# Patient Record
Sex: MALE | Race: WHITE | NOT HISPANIC OR LATINO | Employment: UNEMPLOYED | URBAN - METROPOLITAN AREA
[De-identification: names, ages, dates, MRNs, and addresses within clinical notes are randomized per-mention and may not be internally consistent; named-entity substitution may affect disease eponyms.]

---

## 2017-08-14 ENCOUNTER — HOSPITAL ENCOUNTER (EMERGENCY)
Facility: HOSPITAL | Age: 47
Discharge: HOME | End: 2017-08-14
Attending: EMERGENCY MEDICINE

## 2017-08-14 ENCOUNTER — APPOINTMENT (EMERGENCY)
Dept: CT IMAGING | Facility: HOSPITAL | Age: 47
End: 2017-08-14

## 2017-08-14 VITALS
HEIGHT: 71 IN | BODY MASS INDEX: 23.1 KG/M2 | RESPIRATION RATE: 18 BRPM | OXYGEN SATURATION: 97 % | DIASTOLIC BLOOD PRESSURE: 97 MMHG | WEIGHT: 165 LBS | SYSTOLIC BLOOD PRESSURE: 141 MMHG | TEMPERATURE: 97.5 F | HEART RATE: 85 BPM
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
## 2017-08-14 DIAGNOSIS — G44.309 POST-TRAUMATIC HEADACHE: ICD-10-CM

## 2017-08-14 DIAGNOSIS — S01.312A LACERATION OF HELIX OF LEFT EAR, INITIAL ENCOUNTER: ICD-10-CM

## 2017-08-14 DIAGNOSIS — F10.929 ALCOHOL INTOXICATION (HCC): ICD-10-CM

## 2017-08-14 DIAGNOSIS — M47.812 CERVICAL SPINE DEGENERATION: ICD-10-CM

## 2017-08-14 DIAGNOSIS — S09.8XXA BLUNT HEAD TRAUMA, INITIAL ENCOUNTER: Primary | ICD-10-CM

## 2017-08-14 PROCEDURE — 90715 TDAP VACCINE 7 YRS/> IM: CPT | Performed by: EMERGENCY MEDICINE

## 2017-08-14 PROCEDURE — 70450 CT HEAD/BRAIN W/O DYE: CPT

## 2017-08-14 PROCEDURE — 90471 IMMUNIZATION ADMIN: CPT

## 2017-08-14 PROCEDURE — 99284 EMERGENCY DEPT VISIT MOD MDM: CPT

## 2017-08-14 PROCEDURE — 72125 CT NECK SPINE W/O DYE: CPT

## 2017-08-14 RX ORDER — LIDOCAINE HYDROCHLORIDE 10 MG/ML
10 INJECTION, SOLUTION EPIDURAL; INFILTRATION; INTRACAUDAL; PERINEURAL ONCE
Status: COMPLETED | OUTPATIENT
Start: 2017-08-14 | End: 2017-08-14

## 2017-08-14 RX ADMIN — LIDOCAINE HYDROCHLORIDE 10 ML: 10 INJECTION, SOLUTION EPIDURAL; INFILTRATION; INTRACAUDAL; PERINEURAL at 03:23

## 2017-08-14 RX ADMIN — TETANUS TOXOID, REDUCED DIPHTHERIA TOXOID AND ACELLULAR PERTUSSIS VACCINE, ADSORBED 0.5 ML: 5; 2.5; 8; 8; 2.5 SUSPENSION INTRAMUSCULAR at 03:43

## 2018-09-20 ENCOUNTER — HOSPITAL ENCOUNTER (EMERGENCY)
Facility: HOSPITAL | Age: 48
Discharge: HOME | End: 2018-09-20
Attending: EMERGENCY MEDICINE | Admitting: EMERGENCY MEDICINE
Payer: COMMERCIAL

## 2018-09-20 VITALS
BODY MASS INDEX: 24.97 KG/M2 | SYSTOLIC BLOOD PRESSURE: 160 MMHG | DIASTOLIC BLOOD PRESSURE: 80 MMHG | WEIGHT: 179.01 LBS | HEART RATE: 70 BPM | OXYGEN SATURATION: 98 % | RESPIRATION RATE: 20 BRPM | TEMPERATURE: 97.9 F

## 2018-09-20 DIAGNOSIS — F10.10 ALCOHOL ABUSE: Primary | ICD-10-CM

## 2018-09-20 LAB — ETHANOL EXG-MCNC: 0 MG/DL

## 2018-09-20 PROCEDURE — 82075 ASSAY OF BREATH ETHANOL: CPT | Performed by: EMERGENCY MEDICINE

## 2018-09-20 PROCEDURE — 99284 EMERGENCY DEPT VISIT MOD MDM: CPT

## 2018-09-20 RX ORDER — DIAZEPAM 5 MG/1
5 TABLET ORAL EVERY 6 HOURS PRN
Status: DISCONTINUED | OUTPATIENT
Start: 2018-09-20 | End: 2018-09-20 | Stop reason: HOSPADM

## 2018-09-20 RX ADMIN — DIAZEPAM 5 MG: 5 TABLET ORAL at 14:36

## 2018-09-20 NOTE — DISCHARGE INSTRUCTIONS
Abuse of Alcohol   WHAT YOU SHOULD KNOW:   Alcohol abuse is when you drink large amounts of alcohol often to change your mood or behavior  CARE AGREEMENT:   You have the right to help plan your care  Learn about your health condition and how it may be treated  Discuss treatment options with your caregivers to decide what care you want to receive  You always have the right to refuse treatment  RISKS:   Medicines to treat alcohol abuse may cause vomiting, stress, anxiety, headaches, or dizziness  Alcohol abuse puts you at risk for disease and injury  Alcohol can damage your brain, heart, kidneys, lungs, and liver  The risk of stroke is greater if you have 5 or more drinks each day  You may act out violently when you abuse alcohol  You may harm yourself and others  Risky sexual behavior could lead to sexually transmitted infections  If you are pregnant and drink alcohol, you and your baby are at risk for serious health problems  Alcohol abuse may put you in a coma and may be life-threatening  WHILE YOU ARE HERE:   Informed consent  is a legal document that explains the tests, treatments, or procedures that you may need  Informed consent means you understand what will be done and can make decisions about what you want  You give your permission when you sign the consent form  You can have someone sign this form for you if you are not able to sign it  You have the right to understand your medical care in words you know  Before you sign the consent form, understand the risks and benefits of what will be done  Make sure all your questions are answered  Psychiatric assessment:  Caregivers will ask if you have a history of psychological trauma, such as physical, sexual, or mental abuse  They will ask if you were given the care that you needed  Caregivers will ask you if you have been a victim of a crime or natural disaster, or if you have a serious injury or disease   They will ask you if you have seen other people being harmed, such as in combat  You will be asked if you drink alcohol or use drugs at present or in the past  Caregivers will ask you if you want to hurt or kill yourself or others  How you answer these questions can help caregivers decide on treatment  To help during treatment, caregivers will ask you about such things as how you feel about it and your hobbies and goals  Caregivers will also ask you about the people in your life who support you  Pulse oximeter: A pulse oximeter is a device that measures the amount of oxygen in your blood  A cord with a clip or sticky strip is placed on your finger, ear, or toe  The other end of the cord is hooked to a machine  Never turn the pulse oximeter or alarm off  An alarm will sound if your oxygen level is low or cannot be read  Vital signs:  Caregivers will check your blood pressure, heart rate, breathing rate, and temperature  They will also ask about your pain  These vital signs give caregivers information about your current health  Intake and Output:  Healthcare providers will keep track of the amount of liquid you are getting  They may also need to know how much you are urinating  Ask your healthcare provider how much liquid you should have each day  You may need to increase or decrease the amount of liquid you have each day  Ask healthcare providers if they need to measure or collect your urine before you dispose of it  An IV  is a small tube placed in your vein that is used to give you medicine or liquids  Medicines:   · Sedative: This medicine is given to help you stay calm and relaxed  · Anticonvulsant medicine: This medicine is given to control seizures  Take this medicine exactly as directed  · Antinausea medicine: This medicine may be given to calm your stomach and prevent vomiting  · Glucose: This medicine may be given to increase the amount of sugar in your blood       · Vitamin supplement:  Alcohol can make it hard for your body to absorb enough vitamin B1  You may be given vitamin B1 if you have low levels  It is also given to prevent alcohol related brain damage  You may also need other vitamin supplements  Tests:   · Blood and urine tests:  Samples of your blood or urine are tested for alcohol  Tests can also show signs of liver, kidney, or heart damage caused by alcohol  You may need to have these tests more than once  · Neurologic exam:  This is also called neuro signs, neuro checks, or neuro status  A neurologic exam can show caregivers how well your brain works after an injury or illness  Caregivers will check how your pupils (black dots in the center of each eye) react to light  They may check your memory and how easily you wake up  Your hand grasp and balance may also be tested  · EKG: This test records the electrical activity of your heart  It will be used to check for damage or problems caused by alcohol  · Chest x-ray: This is a picture of your lungs and heart  Healthcare providers may use the x-ray to look for heart damage, injuries, or signs of infection, such as pneumonia  · CT scan: This test is also called a CAT scan  An x-ray machine uses a computer to take pictures of your brain  The pictures may show damage caused by alcohol abuse  You may be given a dye before the pictures are taken to help healthcare providers see the pictures better  Tell the healthcare provider if you have ever had an allergic reaction to contrast dye  Treatment:   · Brief intervention therapy:  A healthcare provider meets with you to discuss ways to control your risky behaviors, such as drinking and driving  This therapy also helps you set goals to decrease the amount of alcohol you drink  · Breathing support:      ¨ You may need extra oxygen  if your blood oxygen level is lower than it should be  You may get oxygen through a mask placed over your nose and mouth or through small tubes placed in your nostrils   Ask your healthcare provider before you take off the mask or oxygen tubing  ¨ A ventilator  is a machine that gives you oxygen and breathes for you when you cannot breathe well on your own  An endotracheal (ET) tube is put into your mouth or nose and attached to the ventilator  You may need a trach if an ET tube cannot be placed  A trach is a tube put through an incision and into your windpipe  © 2014 [employer] is for End User's use only and may not be sold, redistributed or otherwise used for commercial purposes  All illustrations and images included in CareNotes® are the copyrighted property of A [employer] A M [employer]  or Pavel Ryder  The above information is an  only  It is not intended as medical advice for individual conditions or treatments  Talk to your doctor, nurse or pharmacist before following any medical regimen to see if it is safe and effective for you

## 2018-09-20 NOTE — ED PROVIDER NOTES
History  Chief Complaint   Patient presents with    Detox Evaluation     pt requesting inpt detox services for alcohol abuse     50yo male states he has been an alcoholic daily drinking for the past year  Before was a weekend drinker and alcohol use increased over the past 3 years, but over the past year since his wife kicked him out  He denies any psych history  His PCP prescribes seroquel for sleep at night  He denies SI/HI/AH/VH  He drinks about 100oz of beer a day, has never had full blown alcohol withdrawal but does get shakey if he does not drink and needs a drink to wake him up in the morning to calm the shakes  He last drank a 24oz beer this AM         History provided by:  Patient  Detox Evaluation   Similar prior episodes: yes    Severity:  Moderate  Onset quality:  Gradual  Duration:  1 day  Timing:  Constant  Progression:  Worsening  Chronicity:  New  Suspected agents:  Alcohol  Associated symptoms: no abdominal pain, no agitation, no bladder incontinence, no bowel incontinence, no confusion, no hallucinations, no headaches, no loss of consciousness, no palpitations, no seizures, no shortness of breath, no somnolence, no suicidal ideation, no violence, no vomiting and no weakness    Associated symptoms comment:  Shaking  Risk factors: addiction treatment (has done detox in December and January)    Risk factors: no chronic illness, no mental illness, no psychiatric hx and no withdrawal syndrome        None       Past Medical History:   Diagnosis Date    Alcohol abuse     Hepatitis B        History reviewed  No pertinent surgical history  History reviewed  No pertinent family history  I have reviewed and agree with the history as documented      Social History   Substance Use Topics    Smoking status: Current Every Day Smoker     Packs/day: 1 00    Smokeless tobacco: Never Used    Alcohol use Yes      Comment: over 100 ounces of steel reserve beer/day        Review of Systems   Respiratory: Negative for shortness of breath  Cardiovascular: Negative for palpitations  Gastrointestinal: Negative for abdominal pain, bowel incontinence and vomiting  Genitourinary: Negative for bladder incontinence  Neurological: Negative for seizures, loss of consciousness, weakness and headaches  Psychiatric/Behavioral: Negative for agitation, confusion, hallucinations and suicidal ideas  All other systems reviewed and are negative  Physical Exam  Physical Exam   Constitutional: He is oriented to person, place, and time  He appears well-developed and well-nourished  HENT:   Head: Normocephalic and atraumatic  Eyes: EOM are normal  Pupils are equal, round, and reactive to light  Neck: Neck supple  Cardiovascular: Normal rate and regular rhythm  Pulmonary/Chest: Effort normal and breath sounds normal    Abdominal: Soft  Bowel sounds are normal  He exhibits no distension  There is no tenderness  Musculoskeletal: He exhibits no edema  Neurological: He is alert and oriented to person, place, and time  He exhibits normal muscle tone  Skin: Skin is warm  Psychiatric: His mood appears anxious  Vitals reviewed        Vital Signs  ED Triage Vitals [09/20/18 1250]   Temperature Pulse Respirations Blood Pressure SpO2   97 9 °F (36 6 °C) 85 18 (!) 175/92 97 %      Temp Source Heart Rate Source Patient Position - Orthostatic VS BP Location FiO2 (%)   Oral -- -- -- --      Pain Score       --           Vitals:    09/20/18 1250   BP: (!) 175/92   Pulse: 85       Visual Acuity      ED Medications  Medications   diazepam (VALIUM) tablet 5 mg (5 mg Oral Given 9/20/18 1436)       Diagnostic Studies  Results Reviewed     Procedure Component Value Units Date/Time    POCT alcohol breath test [[id number]]  (Normal) Resulted:  09/20/18 1352    Lab Status:  Final result Updated:  09/20/18 1352     EXTBreath Alcohol 0 000                 No orders to display              Procedures  Procedures       Phone Contacts  ED Phone Contact    ED Course  ED Course as of Sep 20 1619   Thu Sep 20, 2018   1614 Note from crisis says pt has placement available detox  They will pick him up at home at 7:30  MDM  Number of Diagnoses or Management Options  Alcohol abuse: new and requires workup     Amount and/or Complexity of Data Reviewed  Clinical lab tests: ordered and reviewed  Discuss the patient with other providers: yes      CritCare Time    Disposition  Final diagnoses:   Alcohol abuse     Time reflects when diagnosis was documented in both MDM as applicable and the Disposition within this note     Time User Action Codes Description Comment    9/20/2018  2:10 PM Milton STEVENSON Add [F10 10] Alcohol abuse       ED Disposition     ED Disposition Condition Comment    Discharge  Karlos Haas discharge to home/self care  Condition at discharge: Good        Follow-up Information     Follow up With Specialties Details Why [address] [place] [address],  Family Medicine   [address] [place] [address] [address]  [phone]      Go to detox at 7:30 that was arranged  Patient's Medications    No medications on file     No discharge procedures on file      ED Provider  Electronically Signed by           Arielle Amezquita MD  09/20/18 9092

## 2018-09-20 NOTE — ED NOTES
Pt is a 46 y o  male who was brought to the ED seeking detox for alcohol abuse  Pt reports that he has been drinking about 2-3 40oz beers a day  Pt came to the ER as he felt as if he was actively detoxing since he had not drank since around 9am  Pt relates that he went to probation yesterday, but had been drinking  He was supposed to be back today, but due to detoxing he came here instead  Pt reports that he informed his  that he was coming here  Pt has been to detox twice in the past, last being in January 2018  Pt never followed up with outpatient treatment  Pt is now on probation in [place] for DUI charges  Pt denies any know seizures or DTs, pt's withdrawal symptoms usually consist of shaking, cold sweats, stomach pains and diarrhea  Pt denies any suicidal/homicidal ideations and auditory/visual hallucinations  Pt has no MH history  Pt does feel he needs inpatient detox at this time to help get clean       Chief Complaint   Patient presents with    Detox Evaluation     pt requesting inpt detox services for alcohol abuse     Intake Assessment completed, Safety risk Assessment completed    CLARA Gamino  09/20/18   [id number]

## 2018-09-20 NOTE — ED NOTES
Spoke with Ysabel at [facility], who confirmed pt is scheduled to be picked up by their team at 19:30  Pt will be picked up from his home so that he can get things together prior to going       CLARA Hill  09/20/18   [id number]

## 2018-09-20 NOTE — ED NOTES
Call placed to [facility], spoke with Ysabel, who reports a detox bed is available at their [facility] location  Pt currently on phone providing additional information       BaljeetCLARA Pacheco  09/20/18   1600

## 2018-10-20 ENCOUNTER — HOSPITAL ENCOUNTER (EMERGENCY)
Facility: HOSPITAL | Age: 48
Discharge: HOME | End: 2018-10-21
Attending: EMERGENCY MEDICINE | Admitting: EMERGENCY MEDICINE
Payer: COMMERCIAL

## 2018-10-20 DIAGNOSIS — F10.10 ALCOHOL ABUSE: Primary | ICD-10-CM

## 2018-10-20 DIAGNOSIS — F10.239 ALCOHOL WITHDRAWAL (HCC): ICD-10-CM

## 2018-10-20 LAB
ANION GAP SERPL CALCULATED.3IONS-SCNC: 14 MMOL/L (ref 4–13)
APAP SERPL-MCNC: <2 UG/ML (ref 10–30)
BASOPHILS # BLD AUTO: 0.1 THOUSANDS/ΜL (ref 0–0.1)
BASOPHILS NFR BLD AUTO: 1 % (ref 0–1)
BUN SERPL-MCNC: 12 MG/DL (ref 5–25)
CALCIUM SERPL-MCNC: 8.8 MG/DL (ref 8.3–10.1)
CHLORIDE SERPL-SCNC: 102 MMOL/L (ref 100–108)
CO2 SERPL-SCNC: 25 MMOL/L (ref 21–32)
CREAT SERPL-MCNC: 0.88 MG/DL (ref 0.6–1.3)
EOSINOPHIL # BLD AUTO: 0.18 THOUSAND/ΜL (ref 0–0.61)
EOSINOPHIL NFR BLD AUTO: 2 % (ref 0–6)
ERYTHROCYTE [DISTWIDTH] IN BLOOD BY AUTOMATED COUNT: 13.1 % (ref 11.6–15.1)
ETHANOL EXG-MCNC: 0.28 MG/DL
ETHANOL SERPL-MCNC: 361 MG/DL (ref 0–3)
GFR SERPL CREATININE-BSD FRML MDRD: 100 ML/MIN/1.73SQ M
GLUCOSE SERPL-MCNC: 154 MG/DL (ref 65–140)
HCT VFR BLD AUTO: 51.1 % (ref 36.5–49.3)
HGB BLD-MCNC: 17.8 G/DL (ref 12–17)
IMM GRANULOCYTES # BLD AUTO: 0.03 THOUSAND/UL (ref 0–0.2)
IMM GRANULOCYTES NFR BLD AUTO: 0 % (ref 0–2)
LYMPHOCYTES # BLD AUTO: 2.53 THOUSANDS/ΜL (ref 0.6–4.47)
LYMPHOCYTES NFR BLD AUTO: 26 % (ref 14–44)
MCH RBC QN AUTO: 33.6 PG (ref 26.8–34.3)
MCHC RBC AUTO-ENTMCNC: 34.8 G/DL (ref 31.4–37.4)
MCV RBC AUTO: 97 FL (ref 82–98)
MONOCYTES # BLD AUTO: 0.9 THOUSAND/ΜL (ref 0.17–1.22)
MONOCYTES NFR BLD AUTO: 9 % (ref 4–12)
NEUTROPHILS # BLD AUTO: 6.14 THOUSANDS/ΜL (ref 1.85–7.62)
NEUTS SEG NFR BLD AUTO: 62 % (ref 43–75)
NRBC BLD AUTO-RTO: 0 /100 WBCS
PLATELET # BLD AUTO: 224 THOUSANDS/UL (ref 149–390)
PMV BLD AUTO: 9.1 FL (ref 8.9–12.7)
POTASSIUM SERPL-SCNC: 3.7 MMOL/L (ref 3.5–5.3)
RBC # BLD AUTO: 5.29 MILLION/UL (ref 3.88–5.62)
SALICYLATES SERPL-MCNC: 6 MG/DL (ref 3–20)
SODIUM SERPL-SCNC: 141 MMOL/L (ref 136–145)
WBC # BLD AUTO: 9.88 THOUSAND/UL (ref 4.31–10.16)

## 2018-10-20 PROCEDURE — 93005 ELECTROCARDIOGRAM TRACING: CPT

## 2018-10-20 PROCEDURE — 96360 HYDRATION IV INFUSION INIT: CPT

## 2018-10-20 PROCEDURE — 80048 BASIC METABOLIC PNL TOTAL CA: CPT | Performed by: EMERGENCY MEDICINE

## 2018-10-20 PROCEDURE — 36415 COLL VENOUS BLD VENIPUNCTURE: CPT | Performed by: EMERGENCY MEDICINE

## 2018-10-20 PROCEDURE — 99285 EMERGENCY DEPT VISIT HI MDM: CPT

## 2018-10-20 PROCEDURE — 85025 COMPLETE CBC W/AUTO DIFF WBC: CPT | Performed by: EMERGENCY MEDICINE

## 2018-10-20 PROCEDURE — 80329 ANALGESICS NON-OPIOID 1 OR 2: CPT | Performed by: EMERGENCY MEDICINE

## 2018-10-20 PROCEDURE — 96361 HYDRATE IV INFUSION ADD-ON: CPT

## 2018-10-20 PROCEDURE — 80320 DRUG SCREEN QUANTALCOHOLS: CPT | Performed by: EMERGENCY MEDICINE

## 2018-10-20 PROCEDURE — 80076 HEPATIC FUNCTION PANEL: CPT | Performed by: EMERGENCY MEDICINE

## 2018-10-20 PROCEDURE — 82075 ASSAY OF BREATH ETHANOL: CPT | Performed by: EMERGENCY MEDICINE

## 2018-10-20 RX ORDER — BUPROPION HYDROCHLORIDE 150 MG/1
TABLET ORAL
COMMUNITY
Start: 2018-10-05

## 2018-10-20 RX ORDER — SODIUM CHLORIDE 9 MG/ML
1000 INJECTION, SOLUTION INTRAVENOUS ONCE
Status: COMPLETED | OUTPATIENT
Start: 2018-10-20 | End: 2018-10-21

## 2018-10-20 RX ORDER — OXAZEPAM 10 MG
CAPSULE ORAL
COMMUNITY
Start: 2017-12-12

## 2018-10-20 RX ORDER — VARENICLINE TARTRATE 1 MG/1
TABLET, FILM COATED ORAL
COMMUNITY
Start: 2017-01-17 | End: 2018-10-21

## 2018-10-20 RX ORDER — BUSPIRONE HYDROCHLORIDE 10 MG/1
TABLET ORAL
COMMUNITY
Start: 2017-12-12 | End: 2018-10-21

## 2018-10-20 RX ADMIN — SODIUM CHLORIDE 1000 ML/HR: 0.9 INJECTION, SOLUTION INTRAVENOUS at 21:34

## 2018-10-21 VITALS
TEMPERATURE: 98.2 F | RESPIRATION RATE: 20 BRPM | DIASTOLIC BLOOD PRESSURE: 85 MMHG | HEIGHT: 71 IN | OXYGEN SATURATION: 95 % | SYSTOLIC BLOOD PRESSURE: 159 MMHG | BODY MASS INDEX: 23.95 KG/M2 | HEART RATE: 107 BPM | WEIGHT: 171.08 LBS

## 2018-10-21 LAB
ALBUMIN SERPL BCP-MCNC: 4.1 G/DL (ref 3.5–5)
ALP SERPL-CCNC: 96 U/L (ref 46–116)
ALT SERPL W P-5'-P-CCNC: 114 U/L (ref 12–78)
AST SERPL W P-5'-P-CCNC: 144 U/L (ref 5–45)
BILIRUB DIRECT SERPL-MCNC: 0.19 MG/DL (ref 0–0.2)
BILIRUB SERPL-MCNC: 0.5 MG/DL (ref 0.2–1)
ETHANOL EXG-MCNC: 0.05 MG/DL
ETHANOL EXG-MCNC: NORMAL MG/DL
ETHANOL SERPL-MCNC: 113 MG/DL (ref 0–3)
PROT SERPL-MCNC: 8 G/DL (ref 6.4–8.2)

## 2018-10-21 PROCEDURE — 80320 DRUG SCREEN QUANTALCOHOLS: CPT | Performed by: EMERGENCY MEDICINE

## 2018-10-21 PROCEDURE — 82075 ASSAY OF BREATH ETHANOL: CPT | Performed by: EMERGENCY MEDICINE

## 2018-10-21 PROCEDURE — 36415 COLL VENOUS BLD VENIPUNCTURE: CPT | Performed by: EMERGENCY MEDICINE

## 2018-10-21 PROCEDURE — 96372 THER/PROPH/DIAG INJ SC/IM: CPT

## 2018-10-21 PROCEDURE — 96361 HYDRATE IV INFUSION ADD-ON: CPT

## 2018-10-21 RX ORDER — NICOTINE 21 MG/24HR
21 PATCH, TRANSDERMAL 24 HOURS TRANSDERMAL DAILY
Status: DISCONTINUED | OUTPATIENT
Start: 2018-10-21 | End: 2018-10-21

## 2018-10-21 RX ORDER — LORAZEPAM 1 MG/1
1 TABLET ORAL ONCE
Status: COMPLETED | OUTPATIENT
Start: 2018-10-21 | End: 2018-10-21

## 2018-10-21 RX ORDER — NICOTINE 21 MG/24HR
21 PATCH, TRANSDERMAL 24 HOURS TRANSDERMAL DAILY
Status: DISCONTINUED | OUTPATIENT
Start: 2018-10-21 | End: 2018-10-21 | Stop reason: HOSPADM

## 2018-10-21 RX ORDER — LORAZEPAM 2 MG/ML
1 INJECTION INTRAMUSCULAR ONCE
Status: COMPLETED | OUTPATIENT
Start: 2018-10-21 | End: 2018-10-21

## 2018-10-21 RX ADMIN — LORAZEPAM 1 MG: 1 TABLET ORAL at 01:17

## 2018-10-21 RX ADMIN — LORAZEPAM 1 MG: 2 INJECTION INTRAMUSCULAR; INTRAVENOUS at 11:20

## 2018-10-21 RX ADMIN — NICOTINE 21 MG: 21 PATCH TRANSDERMAL at 01:20

## 2018-10-21 RX ADMIN — LORAZEPAM 1 MG: 1 TABLET ORAL at 09:01

## 2018-10-21 NOTE — ED NOTES
Pt has become increasingly agitated toward staff and insisting on leaving to go see his girlfriend, attempts were made to calm patient down which were unsuccessful, provider made aware and orders were placed     Makenzie Ortega RN  10/21/18 2790

## 2018-10-21 NOTE — ED NOTES
Call placed to [facility], spoke with Cecil, who states there is a detox bed available today and their [address] locations  Pt was placed on phone to complete initial assessment       CLARA Jones  10/21/18   4758

## 2018-10-21 NOTE — DISCHARGE INSTRUCTIONS
Abuse of Alcohol   WHAT YOU SHOULD KNOW:   Alcohol abuse is when you drink large amounts of alcohol often to change your mood or behavior  CARE AGREEMENT:   You have the right to help plan your care  Learn about your health condition and how it may be treated  Discuss treatment options with your caregivers to decide what care you want to receive  You always have the right to refuse treatment  RISKS:   Medicines to treat alcohol abuse may cause vomiting, stress, anxiety, headaches, or dizziness  Alcohol abuse puts you at risk for disease and injury  Alcohol can damage your brain, heart, kidneys, lungs, and liver  The risk of stroke is greater if you have 5 or more drinks each day  You may act out violently when you abuse alcohol  You may harm yourself and others  Risky sexual behavior could lead to sexually transmitted infections  If you are pregnant and drink alcohol, you and your baby are at risk for serious health problems  Alcohol abuse may put you in a coma and may be life-threatening  WHILE YOU ARE HERE:   Informed consent  is a legal document that explains the tests, treatments, or procedures that you may need  Informed consent means you understand what will be done and can make decisions about what you want  You give your permission when you sign the consent form  You can have someone sign this form for you if you are not able to sign it  You have the right to understand your medical care in words you know  Before you sign the consent form, understand the risks and benefits of what will be done  Make sure all your questions are answered  Psychiatric assessment:  Caregivers will ask if you have a history of psychological trauma, such as physical, sexual, or mental abuse  They will ask if you were given the care that you needed  Caregivers will ask you if you have been a victim of a crime or natural disaster, or if you have a serious injury or disease   They will ask you if you have seen other people being harmed, such as in combat  You will be asked if you drink alcohol or use drugs at present or in the past  Caregivers will ask you if you want to hurt or kill yourself or others  How you answer these questions can help caregivers decide on treatment  To help during treatment, caregivers will ask you about such things as how you feel about it and your hobbies and goals  Caregivers will also ask you about the people in your life who support you  Pulse oximeter: A pulse oximeter is a device that measures the amount of oxygen in your blood  A cord with a clip or sticky strip is placed on your finger, ear, or toe  The other end of the cord is hooked to a machine  Never turn the pulse oximeter or alarm off  An alarm will sound if your oxygen level is low or cannot be read  Vital signs:  Caregivers will check your blood pressure, heart rate, breathing rate, and temperature  They will also ask about your pain  These vital signs give caregivers information about your current health  Intake and Output:  Healthcare providers will keep track of the amount of liquid you are getting  They may also need to know how much you are urinating  Ask your healthcare provider how much liquid you should have each day  You may need to increase or decrease the amount of liquid you have each day  Ask healthcare providers if they need to measure or collect your urine before you dispose of it  An IV  is a small tube placed in your vein that is used to give you medicine or liquids  Medicines:   · Sedative: This medicine is given to help you stay calm and relaxed  · Anticonvulsant medicine: This medicine is given to control seizures  Take this medicine exactly as directed  · Antinausea medicine: This medicine may be given to calm your stomach and prevent vomiting  · Glucose: This medicine may be given to increase the amount of sugar in your blood       · Vitamin supplement:  Alcohol can make it hard for your body to absorb enough vitamin B1  You may be given vitamin B1 if you have low levels  It is also given to prevent alcohol related brain damage  You may also need other vitamin supplements  Tests:   · Blood and urine tests:  Samples of your blood or urine are tested for alcohol  Tests can also show signs of liver, kidney, or heart damage caused by alcohol  You may need to have these tests more than once  · Neurologic exam:  This is also called neuro signs, neuro checks, or neuro status  A neurologic exam can show caregivers how well your brain works after an injury or illness  Caregivers will check how your pupils (black dots in the center of each eye) react to light  They may check your memory and how easily you wake up  Your hand grasp and balance may also be tested  · EKG: This test records the electrical activity of your heart  It will be used to check for damage or problems caused by alcohol  · Chest x-ray: This is a picture of your lungs and heart  Healthcare providers may use the x-ray to look for heart damage, injuries, or signs of infection, such as pneumonia  · CT scan: This test is also called a CAT scan  An x-ray machine uses a computer to take pictures of your brain  The pictures may show damage caused by alcohol abuse  You may be given a dye before the pictures are taken to help healthcare providers see the pictures better  Tell the healthcare provider if you have ever had an allergic reaction to contrast dye  Treatment:   · Brief intervention therapy:  A healthcare provider meets with you to discuss ways to control your risky behaviors, such as drinking and driving  This therapy also helps you set goals to decrease the amount of alcohol you drink  · Breathing support:      ¨ You may need extra oxygen  if your blood oxygen level is lower than it should be  You may get oxygen through a mask placed over your nose and mouth or through small tubes placed in your nostrils   Ask your healthcare provider before you take off the mask or oxygen tubing  ¨ A ventilator  is a machine that gives you oxygen and breathes for you when you cannot breathe well on your own  An endotracheal (ET) tube is put into your mouth or nose and attached to the ventilator  You may need a trach if an ET tube cannot be placed  A trach is a tube put through an incision and into your windpipe  © 2014 [address] [employer] is for End User's use only and may not be sold, redistributed or otherwise used for commercial purposes  All illustrations and images included in CareNotes® are the copyrighted property of A D A M , Inc  or Pavel [employer]  The above information is an  only  It is not intended as medical advice for individual conditions or treatments  Talk to your doctor, nurse or pharmacist before following any medical regimen to see if it is safe and effective for you  Abuse of Alcohol   AMBULATORY CARE:   Alcohol abuse   · is unhealthy drinking behavior  You may drink too much at one time once a week, or continue to drink too much daily  You continue to drink even though it causes problems  The problems can be alcohol related legal problems or problems with work or family  · If you drink too much at one time, you are binge drinking  Binge drinking is when you have a large amount of alcohol in a short time  Your blood alcohol concentrations (JONATHON) goes above 0 08 g/dLlevel during binge drinking  For men, this usually happens with more than 4 drinks in 2 hours  For women, it is more than 3 drinks in 2 hours  A drink is 12 ounces of beer, 4 ounces of wine, or 1½ ounces of liquor  Common signs and symptoms of alcohol abuse include:  Each person that abuses alcohol may have different symptoms   The following are common signs and symptoms of alcohol abuse:  · Loss of interest in activities, work, and school    · Decreased interest in family and friends    · Depression    · Constant thoughts about drinking    · Not able to control the amount you drink    · Restlessness, or erratic and violent behavior  Call 911 for any of the following:   · You have sudden chest pain or trouble breathing  · You have a seizure or have shaking or trembling  · You feel like you could harm yourself or others  · You were in an accident because of alcohol  Seek care immediately if:   · You have hallucinations (you see or hear things that are not real)  · You cannot stop vomiting or you vomit blood  Contact your healthcare provider if:   · You need help to stop drinking alcohol  · You have questions or concerns about your condition or care  Long-term effects of alcohol abuse:   · Blackouts    · Memory loss    · Dementia    · Liver disease    · Thiamine (vitamin B1) deficiency  Treatment for alcohol abuse  may include the following:  · Detoxification (detox) and withdrawal  is a program that helps you to safely get alcohol out of your body  Detox can also help get rid of the physical need to drink  Healthcare providers monitor the physical symptoms of withdrawal  They may give you medicines to help decrease nausea, dehydration, and seizures  Healthcare providers will also monitor your blood pressure, heart and breathing rates, and your temperature  Symptoms of anxiety, depression, and suicidal thoughts are also monitored and managed during detox  Healthcare providers may give you medicines for these symptoms and therapy sessions will be available to you  Detox is usually done at a detox center or in a hospital  Healthcare providers do not recommend that you try to detox at home or by yourself  Withdrawal symptoms may become life threatening  The center can help you find 12 step programs or an individual therapist to help with emotional support after detox  · Inpatient and outpatient treatment  focus on your personal needs to help you stop drinking   Treatment helps you understand the reasons you abuse alcohol  Counselors and therapists provide you with support and help you find ways to cope instead of drinking  You may need inpatient treatment to provide a controlled environment  You may need outpatient treatment after your inpatient treatment is complete  · Alcohol aversion therapy  takes away the desire to drink by causing a negative reaction when you drink  Healthcare providers may give you medicines that cause nausea and vomiting when you drink alcohol  They may instead give you a medicine that decreases your urge to drink alcohol  These medicines are used to help you stop drinking or reduce the amount you drink  They can also help you avoid relapse  Risks of alcohol abuse:  Alcohol abuse increases your risk for gastrointestinal cancers, brain damage and problems with your immune system  It also increases your risk for heart, kidney, and lung damage  The risk of stroke increases with alcohol abuse  If you are pregnant and drink alcohol, you and your baby are at risk for serious health problems  Avoid alcohol:  You should stop drinking entirely  Alcohol can damage your brain, heart, and liver  It also increases your risk for injury, high blood pressure, and certain types of cancer  Alcohol is dangerous when you combine it with certain medicines  Do not drive if you drink alcohol:  Make sure someone who has not been drinking can help you get home  Get support:  Most people need support to stop drinking alcohol  Mental health providers, support groups, rehabilitation centers, and your healthcare provider can provide support  For more information:   · Alcoholics Anonymous  Web Address: http://www [employer]/  · Substance Abuse and [address] , [address] [place]  Web Address: https://[facility]/  Follow up with your healthcare provider as directed:  Write down your questions so you remember to ask them during your visits    © 2017 [employer] Information is for End User's use only and may not be sold, redistributed or otherwise used for commercial purposes  All illustrations and images included in CareNotes® are the copyrighted property of A D A M , Inc  or [employer]  The above information is an  only  It is not intended as medical advice for individual conditions or treatments  Talk to your doctor, nurse or pharmacist before following any medical regimen to see if it is safe and effective for you  Alcohol Withdrawal   WHAT YOU NEED TO KNOW:   Alcohol withdrawal is a group of symptoms that occur when you drink alcohol daily and suddenly stop drinking  It can begin within 5 hours of your last drink and gets worse over 2 to 3 days  Withdrawal may also happen if you suddenly reduce the amount of alcohol that you normally drink  DISCHARGE INSTRUCTIONS:   Call 911 for any of the following:   · You feel like you want to harm yourself or others  Return to the emergency department if:   · You have sudden chest pain or trouble breathing  · Your breathing or heartbeat is faster than usual     · You pass out or think you had a seizure  · You are confused, hallucinating, or extremely agitated  · You cannot stop vomiting, or you vomit blood  · You are shaking and it does not get better after you take your medicine  Contact your healthcare provider if:   · You keep drinking to avoid alcohol withdrawal symptoms  · You need help to stop drinking alcohol  · You have trouble with work, relationships, or school because you drink too much alcohol  · You get into fights because of alcohol  · You have questions or concerns about your condition or care  Medicines:   · Medicines  may be given to calm you and help manage your symptoms  Vitamin supplements, such as thiamine, may be recommended because high alcohol intake can keep your body from absorbing enough vitamins from food      · Take your medicine as directed  Contact your healthcare provider if you think your medicine is not helping or if you have side effects  Tell him of her if you are allergic to any medicine  Keep a list of the medicines, vitamins, and herbs you take  Include the amounts, and when and why you take them  Bring the list or the pill bottles to follow-up visits  Carry your medicine list with you in case of an emergency  Have someone stay with you during withdrawal:  This person should help you take your medicine and keep you in a calm, quiet environment  He should also watch your symptoms and know what to do if your symptoms get worse  Follow up with your healthcare provider within 1 day:  Write down your questions so you remember to ask them during your visits  Learn to stop drinking alcohol safely:  Work with your healthcare provider to develop a plan for you to stop drinking safely  A sudden stop or change can be life-threatening  For support and more information:   · Alcoholics Anonymous  Web Address: http://www [employer]/  © 2017 [address] [employer] Information is for End User's use only and may not be sold, redistributed or otherwise used for commercial purposes  All illustrations and images included in CareNotes® are the copyrighted property of A [employer] M , Inc  or [employer] Ryder  The above information is an  only  It is not intended as medical advice for individual conditions or treatments  Talk to your doctor, nurse or pharmacist before following any medical regimen to see if it is safe and effective for you

## 2018-10-21 NOTE — ED NOTES
Spoke with Cecil at [facility] who shared that everything was good with pt's admission and they have scheduled to pick him up at 2pm  Pt has requested to be picked up from home and [facility] is agreeable to do such       CLARA Yu  10/21/18   [id number]

## 2018-10-21 NOTE — ED PROVIDER NOTES
History  Chief Complaint   Patient presents with    Alcohol Intoxication     Per EMS pt has been drinking all day, states his girlfriend is int he ICU and is depressed     47-year-old male past medical history of alcohol abuse presents after drinking beer today  He states that he wants to see his girlfriend who is in the ICU  He has history prior admissions for detox which have failed  He states he drank "94 oz" of  beer today  He denies any drugs  He states that he feels sad that his girlfriend is in the ICU but denies  suicidal ideation at this time  He states he would like help with his drinking today  No recent falls or injuries  Prior to Admission Medications   Prescriptions Last Dose Informant Patient Reported? Taking? buPROPion (WELLBUTRIN XL) 150 mg 24 hr tablet   Yes Yes   Sig: Take by mouth   oxazepam (SERAX) 10 mg capsule   Yes Yes   Sig: Take by mouth      Facility-Administered Medications: None       Past Medical History:   Diagnosis Date    Alcohol abuse     Hepatitis B        History reviewed  No pertinent surgical history  History reviewed  No pertinent family history  I have reviewed and agree with the history as documented  Social History   Substance Use Topics    Smoking status: Current Every Day Smoker     Packs/day: 1 00    Smokeless tobacco: Never Used    Alcohol use Yes      Comment: over 100 ounces of [employer] beer/day        Review of Systems   Constitutional: Negative for chills and fever  HENT: Negative for dental problem and ear pain  Eyes: Negative for pain and redness  Respiratory: Negative for cough and shortness of breath  Cardiovascular: Negative for chest pain and palpitations  Gastrointestinal: Negative for abdominal pain and nausea  Endocrine: Negative for polydipsia and polyphagia  Genitourinary: Negative for dysuria and frequency  Musculoskeletal: Negative for arthralgias and joint swelling     Skin: Negative for color change and rash  Neurological: Negative for dizziness and headaches  Psychiatric/Behavioral: Positive for dysphoric mood  Negative for behavioral problems and confusion  All other systems reviewed and are negative  Physical Exam  Physical Exam   Constitutional: He is oriented to person, place, and time  He appears well-developed and well-nourished  No distress  Appears disheveled, smells of alcohol   HENT:   Head: Atraumatic  Right Ear: External ear normal    Left Ear: External ear normal    Nose: Nose normal    Eyes: Pupils are equal, round, and reactive to light  Conjunctivae and EOM are normal    Neck: Normal range of motion  Neck supple  No JVD present  Cardiovascular: Normal rate, regular rhythm and normal heart sounds  No murmur heard  Pulmonary/Chest: Effort normal and breath sounds normal  No respiratory distress  He has no wheezes  Abdominal: Soft  Bowel sounds are normal  He exhibits no distension  There is no tenderness  Musculoskeletal: Normal range of motion  He exhibits no edema  Neurological: He is alert and oriented to person, place, and time  No cranial nerve deficit  Skin: Skin is warm and dry  Capillary refill takes less than 2 seconds  He is not diaphoretic  Psychiatric: He has a normal mood and affect  His behavior is normal    Nursing note and vitals reviewed        Vital Signs  ED Triage Vitals [10/20/18 2121]   Temperature Pulse Respirations Blood Pressure SpO2   98 3 °F (36 8 °C) 105 18 148/98 98 %      Temp Source Heart Rate Source Patient Position - Orthostatic VS BP Location FiO2 (%)   Oral Monitor Lying Right arm --      Pain Score       --           Vitals:    10/20/18 2121 10/21/18 0207   BP: 148/98 104/58   Pulse: 105 87   Patient Position - Orthostatic VS: Lying        Visual Acuity      ED Medications  Medications   nicotine (NICODERM CQ) 21 mg/24 hr TD 24 hr patch 21 mg (21 mg Transdermal Medication Applied 10/21/18 0120)   sodium chloride 0 9 % infusion (0 mL/hr Intravenous Stopped 10/21/18 0115)   LORazepam (ATIVAN) tablet 1 mg (1 mg Oral Given 10/21/18 0117)       Diagnostic Studies  Results Reviewed     Procedure Component Value Units Date/Time    Hepatic function panel [[id number]]  (Abnormal) Collected:  10/20/18 2133    Lab Status:  Final result Specimen:  Blood from Arm, Right Updated:  10/21/18 0100     Total Bilirubin 0 50 mg/dL      Bilirubin, Direct 0 19 mg/dL      Alkaline Phosphatase 96 U/L       (H) U/L       (H) U/L      Total Protein 8 0 g/dL      Albumin 4 1 g/dL     Acetaminophen level [[id number]]  (Abnormal) Collected:  10/20/18 2133    Lab Status:  Final result Specimen:  Blood from Arm, Right Updated:  10/20/18 2200     Acetaminophen Level <2 0 (L) ug/mL     Ethanol [[id number]]  (Abnormal) Collected:  10/20/18 2133    Lab Status:  Final result Specimen:  Blood from Arm, Right Updated:  10/20/18 2200     Ethanol Lvl 361 (H) mg/dL     Basic metabolic panel [[id number]]  (Abnormal) Collected:  10/20/18 2133    Lab Status:  Final result Specimen:  Blood from Arm, Right Updated:  10/20/18 2200     Sodium 141 mmol/L      Potassium 3 7 mmol/L      Chloride 102 mmol/L      CO2 25 mmol/L      ANION GAP 14 (H) mmol/L      BUN 12 mg/dL      Creatinine 0 88 mg/dL      Glucose 154 (H) mg/dL      Calcium 8 8 mg/dL      eGFR 100 ml/min/1 73sq m     Narrative:         National Kidney Disease Education Program recommendations are as follows:  GFR calculation is accurate only with a steady state creatinine  Chronic Kidney disease less than 60 ml/min/1 73 sq  meters  Kidney failure less than 15 ml/min/1 73 sq  meters      Salicylate level [[id number]]  (Normal) Collected:  10/20/18 2133    Lab Status:  Final result Specimen:  Blood from Arm, Right Updated:  91/36/21 8040     Salicylate Lvl 6 0 mg/dL     CBC and differential [[id number]]  (Abnormal) Collected:  10/20/18 2133    Lab Status:  Final result Specimen:  Blood from Arm, Right Updated: 10/20/18 2140     WBC 9 88 Thousand/uL      RBC 5 29 Million/uL      Hemoglobin 17 8 (H) g/dL      Hematocrit 51 1 (H) %      MCV 97 fL      MCH 33 6 pg      MCHC 34 8 g/dL      RDW 13 1 %      MPV 9 1 fL      Platelets 203 Thousands/uL      nRBC 0 /100 WBCs      Neutrophils Relative 62 %      Immat GRANS % 0 %      Lymphocytes Relative 26 %      Monocytes Relative 9 %      Eosinophils Relative 2 %      Basophils Relative 1 %      Neutrophils Absolute 6 14 Thousands/µL      Immature Grans Absolute 0 03 Thousand/uL      Lymphocytes Absolute 2 53 Thousands/µL      Monocytes Absolute 0 90 Thousand/µL      Eosinophils Absolute 0 18 Thousand/µL      Basophils Absolute 0 10 Thousands/µL     POCT alcohol breath test [[id number]]  (Normal) Resulted:  10/20/18 2139    Lab Status:  Final result Updated:  10/20/18 2139     EXTBreath Alcohol 0 276                 No orders to display              Procedures  ECG 12 Lead Documentation  Date/Time: 10/20/2018 10:06 PM  Performed by: Nima Ruggiero  Authorized by: Nima Ruggiero     Indications / Diagnosis:  Psych clearance  Comments:      NSR rate of 92, normal axis and intervals no ST elevations or depressions           Phone Contacts  ED Phone Contact    ED Course                               MDM  Number of Diagnoses or Management Options  Alcohol abuse:   Diagnosis management comments: 28-year-old male with past medical alcohol abuse presenting for evaluation for detox  Labs unremarkable except for elevated alcohol level  He states that he feels sad that his girlfriend is in the ICU but that he is not suicidal   Patient is medically clear for psychiatric evaluation at this time  Awaiting crisis evaluation      CritCare Time    Disposition  Final diagnoses:   Alcohol abuse     Time reflects when diagnosis was documented in both MDM as applicable and the Disposition within this note     Time User Action Codes Description Comment    10/21/2018  6:48 AM Lui Cordon Add [F10 10] Alcohol abuse       ED Disposition     None      Follow-up Information    None         Patient's Medications   Discharge Prescriptions    No medications on file     No discharge procedures on file      ED Provider  Electronically Signed by           Nahid Ng MD  10/21/18 0035

## 2018-10-22 LAB
ATRIAL RATE: 92 BPM
P AXIS: 59 DEGREES
PR INTERVAL: 202 MS
QRS AXIS: 92 DEGREES
QRSD INTERVAL: 98 MS
QT INTERVAL: 358 MS
QTC INTERVAL: 442 MS
T WAVE AXIS: 52 DEGREES
VENTRICULAR RATE: 92 BPM

## 2018-10-22 PROCEDURE — 93010 ELECTROCARDIOGRAM REPORT: CPT | Performed by: INTERNAL MEDICINE

## 2018-11-16 ENCOUNTER — HOSPITAL ENCOUNTER (EMERGENCY)
Facility: HOSPITAL | Age: 48
Discharge: HOME | End: 2018-11-17
Attending: EMERGENCY MEDICINE | Admitting: EMERGENCY MEDICINE
Payer: COMMERCIAL

## 2018-11-16 DIAGNOSIS — T50.901A OVERDOSE: ICD-10-CM

## 2018-11-16 DIAGNOSIS — F10.929 ALCOHOL INTOXICATION (HCC): Primary | ICD-10-CM

## 2018-11-16 LAB
ALBUMIN SERPL BCP-MCNC: 4.4 G/DL (ref 3.5–5)
ALP SERPL-CCNC: 79 U/L (ref 46–116)
ALT SERPL W P-5'-P-CCNC: 44 U/L (ref 12–78)
AMPHETAMINES SERPL QL SCN: NEGATIVE
ANION GAP SERPL CALCULATED.3IONS-SCNC: 16 MMOL/L (ref 4–13)
APAP SERPL-MCNC: <2 UG/ML (ref 10–30)
AST SERPL W P-5'-P-CCNC: 22 U/L (ref 5–45)
ATRIAL RATE: 102 BPM
BARBITURATES UR QL: NEGATIVE
BASOPHILS # BLD AUTO: 0.14 THOUSANDS/ΜL (ref 0–0.1)
BASOPHILS NFR BLD AUTO: 1 % (ref 0–1)
BENZODIAZ UR QL: NEGATIVE
BILIRUB DIRECT SERPL-MCNC: 0.1 MG/DL (ref 0–0.2)
BILIRUB SERPL-MCNC: 0.2 MG/DL (ref 0.2–1)
BUN SERPL-MCNC: 8 MG/DL (ref 5–25)
CALCIUM SERPL-MCNC: 9.8 MG/DL (ref 8.3–10.1)
CHLORIDE SERPL-SCNC: 105 MMOL/L (ref 100–108)
CO2 SERPL-SCNC: 24 MMOL/L (ref 21–32)
COCAINE UR QL: NEGATIVE
CREAT SERPL-MCNC: 0.86 MG/DL (ref 0.6–1.3)
EOSINOPHIL # BLD AUTO: 0.39 THOUSAND/ΜL (ref 0–0.61)
EOSINOPHIL NFR BLD AUTO: 3 % (ref 0–6)
ERYTHROCYTE [DISTWIDTH] IN BLOOD BY AUTOMATED COUNT: 12.4 % (ref 11.6–15.1)
ETHANOL SERPL-MCNC: 199 MG/DL (ref 0–3)
GFR SERPL CREATININE-BSD FRML MDRD: 101 ML/MIN/1.73SQ M
GLUCOSE SERPL-MCNC: 123 MG/DL (ref 65–140)
HCT VFR BLD AUTO: 49.8 % (ref 36.5–49.3)
HGB BLD-MCNC: 16.5 G/DL (ref 12–17)
IMM GRANULOCYTES # BLD AUTO: 0.08 THOUSAND/UL (ref 0–0.2)
IMM GRANULOCYTES NFR BLD AUTO: 1 % (ref 0–2)
INR PPP: 1.03 (ref 0.86–1.17)
LYMPHOCYTES # BLD AUTO: 2.08 THOUSANDS/ΜL (ref 0.6–4.47)
LYMPHOCYTES NFR BLD AUTO: 16 % (ref 14–44)
MCH RBC QN AUTO: 32.4 PG (ref 26.8–34.3)
MCHC RBC AUTO-ENTMCNC: 33.1 G/DL (ref 31.4–37.4)
MCV RBC AUTO: 98 FL (ref 82–98)
METHADONE UR QL: NEGATIVE
MONOCYTES # BLD AUTO: 1.11 THOUSAND/ΜL (ref 0.17–1.22)
MONOCYTES NFR BLD AUTO: 8 % (ref 4–12)
NEUTROPHILS # BLD AUTO: 9.63 THOUSANDS/ΜL (ref 1.85–7.62)
NEUTS SEG NFR BLD AUTO: 71 % (ref 43–75)
NRBC BLD AUTO-RTO: 0 /100 WBCS
OPIATES UR QL SCN: NEGATIVE
P AXIS: 47 DEGREES
PCP UR QL: NEGATIVE
PLATELET # BLD AUTO: 303 THOUSANDS/UL (ref 149–390)
PMV BLD AUTO: 9.3 FL (ref 8.9–12.7)
POTASSIUM SERPL-SCNC: 3.7 MMOL/L (ref 3.5–5.3)
PR INTERVAL: 208 MS
PROT SERPL-MCNC: 7.8 G/DL (ref 6.4–8.2)
PROTHROMBIN TIME: 13.4 SECONDS (ref 11.8–14.2)
QRS AXIS: 90 DEGREES
QRSD INTERVAL: 98 MS
QT INTERVAL: 360 MS
QTC INTERVAL: 469 MS
RBC # BLD AUTO: 5.1 MILLION/UL (ref 3.88–5.62)
SALICYLATES SERPL-MCNC: 4.7 MG/DL (ref 3–20)
SODIUM SERPL-SCNC: 145 MMOL/L (ref 136–145)
T WAVE AXIS: 23 DEGREES
THC UR QL: NEGATIVE
VENTRICULAR RATE: 102 BPM
WBC # BLD AUTO: 13.43 THOUSAND/UL (ref 4.31–10.16)

## 2018-11-16 PROCEDURE — 80320 DRUG SCREEN QUANTALCOHOLS: CPT | Performed by: EMERGENCY MEDICINE

## 2018-11-16 PROCEDURE — 80048 BASIC METABOLIC PNL TOTAL CA: CPT | Performed by: EMERGENCY MEDICINE

## 2018-11-16 PROCEDURE — 85025 COMPLETE CBC W/AUTO DIFF WBC: CPT | Performed by: EMERGENCY MEDICINE

## 2018-11-16 PROCEDURE — 36415 COLL VENOUS BLD VENIPUNCTURE: CPT | Performed by: EMERGENCY MEDICINE

## 2018-11-16 PROCEDURE — 99284 EMERGENCY DEPT VISIT MOD MDM: CPT

## 2018-11-16 PROCEDURE — 85610 PROTHROMBIN TIME: CPT | Performed by: EMERGENCY MEDICINE

## 2018-11-16 PROCEDURE — 93005 ELECTROCARDIOGRAM TRACING: CPT

## 2018-11-16 PROCEDURE — 80076 HEPATIC FUNCTION PANEL: CPT | Performed by: EMERGENCY MEDICINE

## 2018-11-16 PROCEDURE — 80307 DRUG TEST PRSMV CHEM ANLYZR: CPT | Performed by: EMERGENCY MEDICINE

## 2018-11-16 PROCEDURE — 80329 ANALGESICS NON-OPIOID 1 OR 2: CPT | Performed by: EMERGENCY MEDICINE

## 2018-11-16 PROCEDURE — 93010 ELECTROCARDIOGRAM REPORT: CPT | Performed by: INTERNAL MEDICINE

## 2018-11-16 NOTE — ED PROVIDER NOTES
History  Chief Complaint   Patient presents with    Alcohol Intoxication     Pt brought via police for evaluation of ETOH  Police report pt drank 80oz beer, and "took a handfull of pills " Pt reports recent release from rehab, states he took multiple pills, unaware of pills or what exact time  HPI patient is a 48-y/o male, he comes emergency department with police apparently he just recently left her rehab  Patient reports he has been depressed over the death of his girlfriend  Patient apparently has 2 warrants pending was arrested and apparently took a handful of pills that he had in his car and apparently drank 80 oz of beer today  Patient was taken to half-way but apparently the intake people at the half-way felt he was too intoxicated to stay there  Patient was brought to the emergency department due to his intoxication and possible drug ingestion  Patient does not know what specific medications he could of taken  Patient reports he may have taken Remeron  He denies any specific pain  He is awake and alert here  Past medical history of alcohol abuse, depression anxiety hepatitis-B  Family history noncontributory  Social history, admits to alcohol abuse, smoker    Prior to Admission Medications   Prescriptions Last Dose Informant Patient Reported? Taking? buPROPion (WELLBUTRIN XL) 150 mg 24 hr tablet   Yes No   Sig: Take by mouth   oxazepam (SERAX) 10 mg capsule   Yes No   Sig: Take by mouth      Facility-Administered Medications: None       Past Medical History:   Diagnosis Date    Alcohol abuse     Anxiety     Depression     Hepatitis B     Psychiatric disorder        History reviewed  No pertinent surgical history  History reviewed  No pertinent family history  I have reviewed and agree with the history as documented      Social History   Substance Use Topics    Smoking status: Current Every Day Smoker     Packs/day: 1 00    Smokeless tobacco: Never Used    Alcohol use Yes      Comment: over 100 ounces of [employer] beer/day        Review of Systems   Constitutional: Negative for diaphoresis, fatigue and fever  HENT: Negative for congestion, ear pain, nosebleeds and sore throat  Eyes: Negative for photophobia, pain, discharge and visual disturbance  Respiratory: Negative for cough, choking, chest tightness, shortness of breath and wheezing  Cardiovascular: Negative for chest pain and palpitations  Gastrointestinal: Negative for abdominal distention, abdominal pain, diarrhea and vomiting  Genitourinary: Negative for dysuria, flank pain and frequency  Musculoskeletal: Negative for back pain, gait problem and joint swelling  Skin: Negative for color change and rash  Neurological: Negative for dizziness, syncope and headaches  Psychiatric/Behavioral: Negative for behavioral problems and confusion  The patient is not nervous/anxious  All other systems reviewed and are negative  Physical Exam  Physical Exam   Constitutional: He is oriented to person, place, and time  He appears well-developed and well-nourished  No distress  HENT:   Head: Normocephalic and atraumatic  Eyes: Pupils are equal, round, and reactive to light  Conjunctivae and EOM are normal    Atraumatic orbits   Neck: Neck supple  c spine w/o midline stepoff or deformity or ttp   Cardiovascular: Normal rate, regular rhythm, normal heart sounds and intact distal pulses  Exam reveals no gallop and no friction rub  No murmur heard  Pulmonary/Chest: Effort normal and breath sounds normal  No respiratory distress  He has no wheezes  He has no rales  He exhibits no tenderness  Abdominal: Soft  Bowel sounds are normal  He exhibits no distension  There is no tenderness  There is no rebound and no guarding  Musculoskeletal: Normal range of motion  He exhibits no edema, tenderness or deformity     Pelvis stable   No midline spinal stepoff or deformity or ttp   Neurological: He is alert and oriented to person, place, and time  GCS 15    Skin: Skin is warm and dry  He is not diaphoretic  Psychiatric: He has a normal mood and affect  Nursing note and vitals reviewed  Vital Signs  ED Triage Vitals   Temperature Pulse Respirations Blood Pressure SpO2   11/16/18 1842 11/16/18 1839 11/16/18 1839 11/16/18 1839 11/16/18 1839   97 7 °F (36 5 °C) (!) 111 19 153/94 96 %      Temp Source Heart Rate Source Patient Position - Orthostatic VS BP Location FiO2 (%)   11/16/18 1842 11/16/18 1839 11/16/18 1839 11/16/18 1839 --   Oral Monitor Sitting Left arm       Pain Score       --                  Vitals:    11/16/18 2200 11/16/18 2230 11/16/18 2300 11/16/18 2330   BP: 99/56 92/51 100/55 99/55   Pulse: 81 85 82    Patient Position - Orthostatic VS:           Visual Acuity      ED Medications  Medications - No data to display    Diagnostic Studies  Results Reviewed     Procedure Component Value Units Date/Time    Acetaminophen level [[id number]]  (Abnormal) Collected:  11/16/18 1854    Lab Status:  Final result Specimen:  Blood from Arm, Right Updated:  11/16/18 1927     Acetaminophen Level <2 0 (L) ug/mL     Basic metabolic panel [[id number]]  (Abnormal) Collected:  11/16/18 1854    Lab Status:  Final result Specimen:  Blood from Arm, Right Updated:  11/16/18 1926     Sodium 145 mmol/L      Potassium 3 7 mmol/L      Chloride 105 mmol/L      CO2 24 mmol/L      ANION GAP 16 (H) mmol/L      BUN 8 mg/dL      Creatinine 0 86 mg/dL      Glucose 123 mg/dL      Calcium 9 8 mg/dL      eGFR 101 ml/min/1 73sq m     Narrative:         National Kidney Disease Education Program recommendations are as follows:  GFR calculation is accurate only with a steady state creatinine  Chronic Kidney disease less than 60 ml/min/1 73 sq  meters  Kidney failure less than 15 ml/min/1 73 sq  meters      Hepatic function panel [[id number]]  (Normal) Collected:  11/16/18 1854    Lab Status:  Final result Specimen:  Blood from Arm, Right Updated:  11/16/18 1926     Total Bilirubin 0 20 mg/dL      Bilirubin, Direct 0 10 mg/dL      Alkaline Phosphatase 79 U/L      AST 22 U/L      ALT 44 U/L      Total Protein 7 8 g/dL      Albumin 4 4 g/dL     Salicylate level [[id number]]  (Normal) Collected:  11/16/18 1854    Lab Status:  Final result Specimen:  Blood from Arm, Right Updated:  28/38/09 2551     Salicylate Lvl 4 7 mg/dL     Ethanol [[id number]]  (Abnormal) Collected:  11/16/18 1854    Lab Status:  Final result Specimen:  Blood from Arm, Right Updated:  11/16/18 1922     Ethanol Lvl 199 (H) mg/dL     Rapid drug screen, urine [[id number]]  (Normal) Collected:  11/16/18 1858    Lab Status:  Final result Specimen:  Urine from Urine, Clean Catch Updated:  11/16/18 1920     Amph/Meth UR Negative     Barbiturate Ur Negative     Benzodiazepine Urine Negative     Cocaine Urine Negative     Methadone Urine Negative     Opiate Urine Negative     PCP Ur Negative     THC Urine Negative    Narrative:         FOR MEDICAL PURPOSES ONLY  IF CONFIRMATION NEEDED PLEASE CONTACT THE LAB WITHIN 5 DAYS      Drug Screen Cutoff Levels:  AMPHETAMINE/METHAMPHETAMINES  1000 ng/mL  BARBITURATES     200 ng/mL  BENZODIAZEPINES     200 ng/mL  COCAINE      300 ng/mL  METHADONE      300 ng/mL  OPIATES      300 ng/mL  PHENCYCLIDINE     25 ng/mL  THC       50 ng/mL    Protime-INR [[id number]]  (Normal) Collected:  11/16/18 1854    Lab Status:  Final result Specimen:  Blood from Arm, Right Updated:  11/16/18 1915     Protime 13 4 seconds      INR 1 03    CBC and differential [[id number]]  (Abnormal) Collected:  11/16/18 1854    Lab Status:  Final result Specimen:  Blood from Arm, Right Updated:  11/16/18 1912     WBC 13 43 (H) Thousand/uL      RBC 5 10 Million/uL      Hemoglobin 16 5 g/dL      Hematocrit 49 8 (H) %      MCV 98 fL      MCH 32 4 pg      MCHC 33 1 g/dL      RDW 12 4 %      MPV 9 3 fL      Platelets 433 Thousands/uL      nRBC 0 /100 WBCs      Neutrophils Relative 71 %      Immat GRANS % 1 % Lymphocytes Relative 16 %      Monocytes Relative 8 %      Eosinophils Relative 3 %      Basophils Relative 1 %      Neutrophils Absolute 9 63 (H) Thousands/µL      Immature Grans Absolute 0 08 Thousand/uL      Lymphocytes Absolute 2 08 Thousands/µL      Monocytes Absolute 1 11 Thousand/µL      Eosinophils Absolute 0 39 Thousand/µL      Basophils Absolute 0 14 (H) Thousands/µL                  No orders to display              Procedures  ECG 12 Lead Documentation  Date/Time: 11/16/2018 11:17 PM  Performed by: Armando Payne by: Darshan Santoro     Indications / Diagnosis:  Alcohol abuse, overdose  ECG reviewed by me, the ED Provider: yes    Patient location:  ED  Previous ECG:     Previous ECG:  Compared to current    Comparison ECG info:  October 20, 2018    Similarity:  Changes noted  Interpretation:     Interpretation: non-specific    Rate:     ECG rate:  One hundred two    ECG rate assessment: tachycardic    Rhythm:     Rhythm: sinus tachycardia    Conduction:     Conduction: normal    ST segments:     ST segments:  Non-specific  Comments:      Right axis, sinus tachycardia no acute ST-T wave changes no change from prior EKG  Phone Contacts  ED Phone Contact    ED Course        diagnostic testing showed a Tylenol level 0, no sign of toxicity, salicylate level was also 0  Patient's ethanol level was 199 consistent with intoxication  Patient's electrolytes were within normal limits no sign of renal dysfunction, liver functions were normal no sign of liver toxicity  rapid drugs testing was normal   No sign of abnormal drugs  patient's white count was elevated at 13 4 nonspecific finding possible stress response, hemoglobin was normal at 16 5 no sign of anemia  approximately midnight ; patient now awake and alert ambulatory without   any difficulty  Patient is actively shouting profanities at the skilled nursing guards but is awake alert and cogent  Patient is cooperative when calmed  Discussed with patient will need to go to custodial as the police have warrants  Patient is now stable to be in custodial  No indication for further observation or hospitalization  MDM medical decision making 58-year-old male, apparently drinking today and took a handful of pills; arrested by the police and came to the hospital due to the significant toxication  Apparently they were unable to keep the patient in custodial due to his intoxication  Patient is awake and alert here -normal neurological exam   Patient is intoxicated  Patient will be observed for his intoxication is overdose until he is ambulatory  Discussed with the detention guards  Patient was observed until ambulatory, awake and alert, no sign of toxicity no sign of worsening intoxication  Patient is safe to be discharged to the police  CritCare Time    Disposition  Final diagnoses:   Alcohol intoxication ([facility] [address] )   Overdose     Time reflects when diagnosis was documented in both MDM as applicable and the Disposition within this note     Time User Action Codes Description Comment    11/16/2018 11:18 PM Lyubov Huertas [F10 929] Alcohol intoxication ([facility] [address] )     11/16/2018 11:18 PM [place], [address] [facility] Overdose       ED Disposition     ED Disposition Condition Comment    Discharge  Denver Sepulveda discharge to home/self care  Condition at discharge: Good        Follow-up Information     Follow up With Specialties Details Why [address] [place] [address], [address] [place] [address]   [address] [address] [facility] [address]  [phone]            Discharge Medication List as of 11/16/2018 11:52 PM      CONTINUE these medications which have NOT CHANGED    Details   buPROPion (WELLBUTRIN XL) 150 mg 24 hr tablet Take by mouth, Starting Fri 10/5/2018, Historical Med      oxazepam (SERAX) 10 mg capsule Take by mouth, Starting Tue 12/12/2017, Historical Med           No discharge procedures on file      ED Provider  Electronically Signed by           Olivia Perez MD  11/17/18 0641

## 2018-11-17 VITALS
RESPIRATION RATE: 17 BRPM | SYSTOLIC BLOOD PRESSURE: 99 MMHG | HEART RATE: 82 BPM | OXYGEN SATURATION: 95 % | TEMPERATURE: 97.7 F | DIASTOLIC BLOOD PRESSURE: 55 MMHG

## 2018-11-17 NOTE — ED NOTES
Pt currently sleeping  Appears to be in no distress  Vital signs WNL  Offered toileting assistance but refused  Correction officers at bedside        Madyson Friedman RN  11/16/18 9386

## 2018-11-17 NOTE — DISCHARGE INSTRUCTIONS
Normal care  Increase liquids  Follow up with your doctor     Adult Overdose   WHAT YOU NEED TO KNOW:   An overdose occurs when you take more medicine than is safe to take  An overdose may be mild, or it may be a life-threatening emergency  You may feel drowsy, dizzy, or nauseated, depending on what medicine you took  No specific harm was found to your body as a result of your overdose  Your symptoms have decreased over the last 6 to 12 hours  DISCHARGE INSTRUCTIONS:   Call 911 if you or someone close to you has any of the following symptoms:   · Your face is very pale and clammy to the touch  · Your body is limp or you are unable to speak  · You cannot be awakened  · Your breathing is slower or faster than usual      · Your heart is beating slower than usual     · You feel confused or more tired than usual, or you are sweating more than normal     · Your speech is slurred  · Your fingernails or lips are blue or purple  Return to the emergency department if:   · You have severe nausea and vomiting  · You cannot have a bowel movement or urinate  · Your skin and the whites of your eyes turn yellow  Contact your healthcare provider if:   · You think your medicine is not working  · You have nausea, vomiting, diarrhea, or abdominal cramps  · You have questions or concerns about your medicine  Take your medicine as directed:  Contact your healthcare provider if you think your medicine is not helping or if you have side effects  Do not take more medicine that is prescribed  Keep your medicines in the original containers  Keep a list of the medicines, vitamins, and herbs you take  Include the amounts, and when and why you take them  Do not share your medicine with others  Prevent another overdose:   · Read labels carefully  Read the labels of all the medicines that you take  Never take more than the label says to take   If you have questions, ask your pharmacist or healthcare provider  · Do not drink alcohol  Alcohol increases your risk for another overdose  Alcohol can also hide important symptoms that you need to call your healthcare provider for  · Do not drive or operate machinery  until your healthcare provider says it is okay  These activities may be dangerous after an overdose  · Use caution if you take more than one medicine at a time  Mixing medicines or taking more than one medicine at a time can be dangerous  · Tell your family or friends what medicines you are taking  Talk with them about what to do if you have an overdose  Follow up with your healthcare provider as directed: You may need to see a counselor or psychiatrist  Write down your questions so you remember to ask them during your visits  © 2017 [address] Information is for End User's use only and may not be sold, redistributed or otherwise used for commercial purposes  All illustrations and images included in CareNotes® are the copyrighted property of A D A M , Inc  or [employer]  The above information is an  only  It is not intended as medical advice for individual conditions or treatments  Talk to your doctor, nurse or pharmacist before following any medical regimen to see if it is safe and effective for you  Alcohol Intoxication   WHAT YOU NEED TO KNOW:   Alcohol intoxication is a harmful physical condition caused when you drink more alcohol than your body can handle  It is also called ethanol poisoning, or being drunk  DISCHARGE INSTRUCTIONS:   Medicine: You may be given medicine to manage the signs and symptoms of alcohol intoxication  Take your medicine as directed  Contact your healthcare provider if you think your medicine is not helping or if you have side effects  Tell him if you are allergic to any medicine  Keep a list of the medicines, vitamins, and herbs you take  Include the amounts, and when and why you take them   Bring the list or the pill bottles to follow-up visits  Keep the list with you in case of emergency  Follow up with your healthcare provider as directed:  Write down your questions so you remember to ask them during your visits  Limit or avoid alcohol:  Men should not have more than 2 drinks per day  Women should not have more than 1 drink per day  A drink is 12 ounces of beer, 5 ounces of wine, or 1½ ounces of liquor  Do not drive or operate machines when you drink alcohol:  Make sure you always have someone to drive you when you drink alcohol  Learn ways to manage stress  Deep breathing, meditation, and listening to music may help you cope with stressful events  Talk to your caregiver about other ways to manage stress  For more information:   · Alcoholics Anonymous  Web Address: http://www [place] info/  Contact your healthcare provider if:   · You need help to stop drinking alcohol  · You have trouble with work or school because you drink too much alcohol  · You have physical or verbal fights because of alcohol  · You have questions or concerns about your condition or care  Seek care immediately or call 911 if:   · You have sudden trouble breathing or chest pain  · You have a seizure  · You feel sad enough to harm yourself or others  · You have hallucinations (you see or hear things that are not real)  · You cannot stop vomiting  · You were in an accident because of alcohol  © 2017 [address] Information is for End User's use only and may not be sold, redistributed or otherwise used for commercial purposes  All illustrations and images included in CareNotes® are the copyrighted property of A D A M , Inc  or [employer]  The above information is an  only  It is not intended as medical advice for individual conditions or treatments  Talk to your doctor, nurse or pharmacist before following any medical regimen to see if it is safe and effective for you

## 2018-11-17 NOTE — ED NOTES
Pt currently sleeping  Appears to be in no distress  Vital signs WNL  Correction officers at bedside        Sakshi Gallegos RN  11/16/18 6476

## 2018-11-17 NOTE — ED NOTES
Pt currently sleeping  Appears to be in no distress  Vital signs WNL  Correction officers at bedside        Nakia Boudreaux RN  11/16/18 4982

## 2018-11-17 NOTE — ED NOTES
Pt currently sleeping  Appears to be in no distress  Vital signs WNL  Correction officers at bedside        Juan Pablo Paulino RN  11/16/18 5794